# Patient Record
Sex: MALE | Race: WHITE | NOT HISPANIC OR LATINO | Employment: OTHER | URBAN - METROPOLITAN AREA
[De-identification: names, ages, dates, MRNs, and addresses within clinical notes are randomized per-mention and may not be internally consistent; named-entity substitution may affect disease eponyms.]

---

## 2017-03-13 ENCOUNTER — TELEPHONE (OUTPATIENT)
Dept: TRANSPLANT | Facility: CLINIC | Age: 62
End: 2017-03-13

## 2017-03-13 LAB
EXT ALBUMIN: 3.9
EXT ALKALINE PHOSPHATASE: 64
EXT ALT: 10
EXT AST: 13
EXT BASOPHIL%: 0.5
EXT BILIRUBIN TOTAL: 0.4
EXT BUN: 28
EXT CALCIUM: 8.8
EXT CHLORIDE: 103
EXT CO2: 27
EXT CREATININE: 2 MG/DL
EXT EOSINOPHIL%: 1
EXT GFR MDRD NON AF AMER: 25
EXT GLUCOSE: 154
EXT HBV DNA, QUALITATIVE PCR: NOT DETECTED
EXT HEMATOCRIT: 43
EXT HEMOGLOBIN: 14.1
EXT LYMPH%: 32.4
EXT MONOCYTES%: 7.3
EXT PLATELETS: 96
EXT POTASSIUM: 5
EXT PROTEIN TOTAL: 6.5
EXT SEGS%: 58.8
EXT SODIUM: 138 MMOL/L
EXT TACROLIMUS LVL: 5.8
EXT WBC: 6.3

## 2017-03-13 NOTE — TELEPHONE ENCOUNTER
----- Message from Jesus Brasher MD sent at 3/13/2017  2:23 PM CDT -----  Results reviewed    Creatinine is rising- is he unwell?    - oral rehydration and recheck on Thursday

## 2017-03-13 NOTE — TELEPHONE ENCOUNTER
"Called and spoke with pt to let him know that Dr. Brasher reviewed his labs, and his creatinine was elevated. Pt stated "that he was hospitalized in January for kidney failure, and staph in his blood, and E. Coli in his lungs". Pt stated he has an appointment with a nephrologist and with infectious disease for follow up later this month. Asked pt to please keep us informed. Pt stated he would do so.    Message sent to Dr. Brasher with this update in pt's health.  "

## 2017-03-16 ENCOUNTER — TELEPHONE (OUTPATIENT)
Dept: TRANSPLANT | Facility: CLINIC | Age: 62
End: 2017-03-16

## 2017-03-16 NOTE — TELEPHONE ENCOUNTER
"----- Message from Jesus Brasher MD sent at 3/15/2017  4:52 PM CDT -----  Next week- Tuesday/Wednesday    ----- Message -----     From: Celena Rivas RN     Sent: 3/15/2017   4:43 PM       To: Jesus Brasher MD    Ok. I will get him scheduled. When would you like him to repeat labs?     ----- Message -----     From: Jesus Brasher MD     Sent: 3/15/2017   4:24 PM       To: Celena Rivas RN    Can he come to see us sometime in the next few weeks?    ----- Message -----     From: Celena Rivas RN     Sent: 3/13/2017   4:18 PM       To: Jesus Brasher MD    I called this pt, and it turns out that he is quite unwell. Pt stated that "he was hospitalized in January for that he was hospitalized in January for kidney failure, and staph in his blood, and E. Coli in his lungs". He also stated that he has follow up appointments with a nephrologist and Infectious disease later this month.     ----- Message -----     From: Jesus Brasher MD     Sent: 3/13/2017   2:23 PM       To: [facility]    Results reviewed    Creatinine is rising- is he unwell?    - oral rehydration and recheck on Thursday          "

## 2017-03-16 NOTE — TELEPHONE ENCOUNTER
Called and spoke with pt to let him know that I updated Dr. Brasher with pt's situation. Dr. Brasher would like pt to repeat labs next week,  would like pt to come to clinic in May. Pt repeated and verbalized understanding.

## 2017-04-07 LAB
EXT ALBUMIN: 3.8
EXT ALKALINE PHOSPHATASE: 58
EXT ALT: 11
EXT AST: 13
EXT BASOPHIL%: 0.4
EXT BILIRUBIN TOTAL: 0.5
EXT BUN: 16
EXT CALCIUM: 9.3
EXT CHLORIDE: 104
EXT CO2: 29
EXT CREATININE: 1.3 MG/DL
EXT EOSINOPHIL%: 1.2
EXT GFR MDRD NON AF AMER: 42
EXT GLUCOSE: 141
EXT HEMATOCRIT: 45
EXT HEMOGLOBIN: 15
EXT LYMPH%: 24.9
EXT MONOCYTES%: 6.8
EXT PLATELETS: 109
EXT POTASSIUM: 4.8
EXT PROTEIN TOTAL: 6.6
EXT SEGS%: 66.7
EXT SODIUM: 138 MMOL/L
EXT TACROLIMUS LVL: 7.9
EXT WBC: 8

## 2017-04-11 ENCOUNTER — TELEPHONE (OUTPATIENT)
Dept: TRANSPLANT | Facility: CLINIC | Age: 62
End: 2017-04-11

## 2017-05-19 ENCOUNTER — TELEPHONE (OUTPATIENT)
Dept: TRANSPLANT | Facility: CLINIC | Age: 62
End: 2017-05-19

## 2017-05-19 LAB
EXT ALBUMIN: 3.9
EXT ALKALINE PHOSPHATASE: 68
EXT ALT: 12
EXT AST: 13
EXT BASOPHIL%: 0.5
EXT BILIRUBIN TOTAL: 0.6
EXT BUN: 25
EXT CALCIUM: 8.5
EXT CHLORIDE: 102
EXT CO2: 27
EXT CREATININE: 1.9 MG/DL
EXT EOSINOPHIL%: 3
EXT GFR MDRD NON AF AMER: 27
EXT GLUCOSE: 184
EXT HEMATOCRIT: 45
EXT HEMOGLOBIN: 15.5
EXT LYMPH%: 19.1
EXT MONOCYTES%: 7
EXT PLATELETS: 115
EXT POTASSIUM: 4.5
EXT PROTEIN TOTAL: 6.4
EXT SEGS%: 70.4
EXT SODIUM: 135 MMOL/L
EXT TACROLIMUS LVL: 9.3
EXT WBC: 10.3

## 2017-05-19 NOTE — TELEPHONE ENCOUNTER
Called and spoke with pt to let him know that Dr. Brasher reveiwed his labs, and would like him to decrease his Prograf to 2 mg in the morning, and 1 mg in the evening. Repeat labs Mon. 5/22. Pt repeated and verbalized understanding. Pt is taking lisinopril and losartan. Message sent to Dr. Brasher.

## 2017-05-19 NOTE — TELEPHONE ENCOUNTER
----- Message from Jesus Brasher MD sent at 5/19/2017  1:51 PM CDT -----  Results reviewed  Reduce tac to 2/1  Can you check that he is not taking both lisinopril AND losartan ( I just see that both meds are listed)?

## 2017-05-26 ENCOUNTER — TELEPHONE (OUTPATIENT)
Dept: TRANSPLANT | Facility: CLINIC | Age: 62
End: 2017-05-26

## 2017-05-26 NOTE — TELEPHONE ENCOUNTER
Called pt's lab for lab results. Pt did not go. Called and spoke with pt. Pt said he forgot to go. Will go Mon. 5/29. Also advised pt that Dr. Brasher recommends he go back to doctor who prescribed lisinopril and losartan to see if he needs to continue to be on both. Pt repeated and verbalized understanding.

## 2017-05-29 ENCOUNTER — TELEPHONE (OUTPATIENT)
Dept: TRANSPLANT | Facility: CLINIC | Age: 62
End: 2017-05-29

## 2017-05-29 NOTE — TELEPHONE ENCOUNTER
Returned call to lab. Spoke with Kayla. Confirmed that what was on order sheet was what we need to be drawn.

## 2017-06-01 LAB
EXT ALBUMIN: 3.8
EXT ALKALINE PHOSPHATASE: 72
EXT ALT: 11
EXT AST: 12
EXT BASOPHIL%: 0.6
EXT BILIRUBIN TOTAL: 0.4
EXT BUN: 23
EXT CALCIUM: 8.7
EXT CHLORIDE: 104
EXT CO2: 29
EXT CREATININE: 1.2 MG/DL
EXT EOSINOPHIL%: 7.9
EXT GFR MDRD NON AF AMER: 46
EXT GLUCOSE: 175
EXT HEMATOCRIT: 45
EXT HEMOGLOBIN: 15.1
EXT HEP B QUANT: NOT DETECTED
EXT LYMPH%: 27
EXT MONOCYTES%: 5.8
EXT PLATELETS: 114
EXT POTASSIUM: 4.6
EXT PROTEIN TOTAL: 6.4
EXT SODIUM: 138 MMOL/L
EXT TACROLIMUS LVL: 6.8
EXT WBC: 10.2

## 2017-06-02 ENCOUNTER — TELEPHONE (OUTPATIENT)
Dept: TRANSPLANT | Facility: CLINIC | Age: 62
End: 2017-06-02

## 2017-06-02 NOTE — TELEPHONE ENCOUNTER
Called and left pt voicemail to let him know that Dr. Brasher reviewed his labs, and they were stable. No changes needed. Repeat labs Mon. 7/3. Also sent letter.

## 2017-07-11 LAB
EXT ALBUMIN: 3.8
EXT ALKALINE PHOSPHATASE: 70
EXT ALT: 11
EXT AST: 13
EXT BASOPHIL%: 0.5
EXT BILIRUBIN TOTAL: 0.6
EXT BUN: 18
EXT CALCIUM: 8.7
EXT CHLORIDE: 100
EXT CO2: 31
EXT CREATININE: 1.4 MG/DL
EXT EOSINOPHIL%: 7.5
EXT GFR MDRD NON AF AMER: 38
EXT GLUCOSE: 184
EXT HEMATOCRIT: 45
EXT HEMOGLOBIN: 15.1
EXT HEP B S AG: NEGATIVE
EXT LYMPH%: 23.2
EXT MONOCYTES%: 6.4
EXT PLATELETS: 110
EXT POTASSIUM: 4.2
EXT PROTEIN TOTAL: 6.6
EXT SEGS%: 62.4
EXT SODIUM: 138 MMOL/L
EXT TACROLIMUS LVL: 5.5
EXT WBC: 9

## 2017-07-12 ENCOUNTER — TELEPHONE (OUTPATIENT)
Dept: TRANSPLANT | Facility: CLINIC | Age: 62
End: 2017-07-12

## 2017-07-12 NOTE — TELEPHONE ENCOUNTER
Labs reviewed by Dr. Brasher  Continue routine labs no changes needed.  Letter sent for next lab appointment 10/2/17

## 2017-09-25 ENCOUNTER — TELEPHONE (OUTPATIENT)
Dept: TRANSPLANT | Facility: CLINIC | Age: 62
End: 2017-09-25

## 2017-09-25 NOTE — TELEPHONE ENCOUNTER
Called patient to see if he was coming in for his scheduled appointment. Primary number not in service. Also tried his wife's number which went to voicemail.

## 2017-09-29 ENCOUNTER — TELEPHONE (OUTPATIENT)
Dept: TRANSPLANT | Facility: CLINIC | Age: 62
End: 2017-09-29

## 2017-10-23 ENCOUNTER — TELEPHONE (OUTPATIENT)
Dept: TRANSPLANT | Facility: CLINIC | Age: 62
End: 2017-10-23

## 2017-10-23 NOTE — TELEPHONE ENCOUNTER
----- Message from Keke Beverly sent at 10/23/2017  4:17 PM CDT -----  Contact: [facility]  Call regarding patient labs, states it was not a critical.     Call [phone]

## 2017-10-23 NOTE — TELEPHONE ENCOUNTER
Returned call. Lab stated they were unable to run Hep. B DNA quant. Called and left pt voicemail to ask him to go back to lab to have test done. Advised to call back so that I know he received my message.

## 2017-10-24 ENCOUNTER — TELEPHONE (OUTPATIENT)
Dept: TRANSPLANT | Facility: CLINIC | Age: 62
End: 2017-10-24

## 2017-10-24 LAB
EXT ALBUMIN: 3.9
EXT ALKALINE PHOSPHATASE: 66
EXT ALT: 11
EXT AST: 15
EXT BASOPHIL%: 0.5
EXT BILIRUBIN TOTAL: 0.7
EXT BUN: 16
EXT CALCIUM: 9
EXT CHLORIDE: 105
EXT CO2: 24
EXT CREATININE: 1.5 MG/DL
EXT EOSINOPHIL%: 1.3
EXT GFR MDRD NON AF AMER: 35
EXT GLUCOSE: 206
EXT HEMATOCRIT: 47
EXT HEMOGLOBIN: 15.4
EXT HEP B S AG: NEGATIVE
EXT LYMPH%: 25.9
EXT MONOCYTES%: 5.9
EXT PLATELETS: 126
EXT POTASSIUM: 4.6
EXT PROTEIN TOTAL: 6.4
EXT SEGS%: 66.4
EXT SODIUM: 138 MMOL/L
EXT TACROLIMUS LVL: 2.9
EXT WBC: 9

## 2017-10-24 NOTE — TELEPHONE ENCOUNTER
Labs reviewed by Dr. Brasher  Continue routine labs no changes needed.  Letter sent for next lab appointment 1/15/18

## 2017-12-06 ENCOUNTER — OFFICE VISIT (OUTPATIENT)
Dept: TRANSPLANT | Facility: CLINIC | Age: 62
End: 2017-12-06
Payer: MEDICARE

## 2017-12-06 VITALS
HEIGHT: 72 IN | BODY MASS INDEX: 37.25 KG/M2 | HEART RATE: 59 BPM | TEMPERATURE: 97 F | SYSTOLIC BLOOD PRESSURE: 137 MMHG | OXYGEN SATURATION: 96 % | DIASTOLIC BLOOD PRESSURE: 86 MMHG | WEIGHT: 275 LBS | RESPIRATION RATE: 20 BRPM

## 2017-12-06 DIAGNOSIS — T50.901D ACCIDENTAL OVERDOSE, SUBSEQUENT ENCOUNTER: ICD-10-CM

## 2017-12-06 DIAGNOSIS — I10 ESSENTIAL HYPERTENSION: ICD-10-CM

## 2017-12-06 DIAGNOSIS — N28.9 RENAL INSUFFICIENCY: ICD-10-CM

## 2017-12-06 DIAGNOSIS — Z94.4 LIVER REPLACED BY TRANSPLANT: Primary | ICD-10-CM

## 2017-12-06 DIAGNOSIS — Z79.4 TYPE 2 DIABETES MELLITUS WITH COMPLICATION, WITH LONG-TERM CURRENT USE OF INSULIN: ICD-10-CM

## 2017-12-06 DIAGNOSIS — Z29.89 PROPHYLACTIC IMMUNOTHERAPY: ICD-10-CM

## 2017-12-06 DIAGNOSIS — J44.9 CHRONIC OBSTRUCTIVE PULMONARY DISEASE, UNSPECIFIED COPD TYPE: ICD-10-CM

## 2017-12-06 DIAGNOSIS — Z86.19 HISTORY OF HEPATITIS C: ICD-10-CM

## 2017-12-06 DIAGNOSIS — E11.8 TYPE 2 DIABETES MELLITUS WITH COMPLICATION, WITH LONG-TERM CURRENT USE OF INSULIN: ICD-10-CM

## 2017-12-06 DIAGNOSIS — Z95.1 H/O CORONARY ARTERY BYPASS SURGERY: ICD-10-CM

## 2017-12-06 DIAGNOSIS — G89.29 OTHER CHRONIC PAIN: ICD-10-CM

## 2017-12-06 PROCEDURE — 99214 OFFICE O/P EST MOD 30 MIN: CPT | Mod: S$GLB,,, | Performed by: NURSE PRACTITIONER

## 2017-12-06 PROCEDURE — 99999 PR PBB SHADOW E&M-EST. PATIENT-LVL III: CPT | Mod: PBBFAC,,, | Performed by: NURSE PRACTITIONER

## 2017-12-06 RX ORDER — DIAZEPAM 10 MG/1
10 TABLET ORAL 2 TIMES DAILY
COMMUNITY
Start: 2017-10-25 | End: 2018-10-25

## 2017-12-06 RX ORDER — INSULIN ASPART 100 [IU]/ML
60 INJECTION, SOLUTION INTRAVENOUS; SUBCUTANEOUS DAILY
COMMUNITY

## 2017-12-06 NOTE — PROGRESS NOTES
Transplant Hepatology  Liver Transplant Recipient Follow-up    Transplant Date: 2007  OS Native Liver Dx: Cirrhosis: Type C    Pedro is here for yearly follow up of his liver transplant.    ORGAN: LIVER  Whole or Partial: whole liver  Donor Type:  - brain death  Donor CMV Status: Positive  Donor HCV Status: Positive  Donor HBcAb: Positive    He has had the following complications since transplant: hypertension, renal insufficiency, diabetes. The noted complications are well controlled.     Subjective:     Interval History: Pedro was last seen on 2016 by Dr. Brasher. He is almost 11 years post liver transplant for cirrhosis 2/2 chronic hepatitis C genotype 1b. He had histologic recurrence of hepatitis C after transplantation and was treated with antiviral therapy including pegylated interferon and ribavirin in . He achieved a sustained virologic response. He also received a HBV core + donor liver and remains on lamivudine. HBV not detected.        He is maintained on tacro  for immunosuppression with good allograft function. LFTs remain normal. He has had some renal insufficiency in the past; Cr 1.2-1.9 over last 6 months. Cr 1.5 on most recent labs in 10/2017. Is taking both lisinopril and losartan. Was also recently put on torsemide by his cardiologist for swelling in BLE.       He was admitted to [place] in 2015 with multidrug overdose, hypercapnic respiratory failure, and acute kidney failure. He notes two subsequent hospitalizations since then; last one in 2017 requiring intubation. He has since had genetic testing and determined that he does not metabolize certain pain medications. He is following with pain management for his back pain, currently on oxymorphone and valium PRN.      He is not satisfied with his current PCP; is scheduled for follow-up with a new provider on 2018 at [facility].    Health Maintenance:   -Bone mineral density: overdue, has  f/u with PCP   -Colonoscopy: overdue; reports polyps on his last colonoscopy. Has f/u with PCP.     -Dermatology: Hx of squamous carcinoma, basal cell, and melatinoid; due for follow-up skin check     -Smoking cessation: He has COPD; continues to smoke but has cut back to 1 pack every 4-5 days. Tried Chantix in the past.   -Weight loss: Is attempting to lose weight with dietary changes; down 5 lb since clinic visit last year.      -BP today: 137/86      Review of Systems   GENERAL: Denies fever, chills. (+) weight loss. (+) night sweats. (+) fatigue.   HEENT: Denies headaches, dizziness, vision/hearing changes  CARDIOVASCULAR: Denies chest pain, palpitations. (+) mild swelling to BLE.   RESPIRATORY: Denies dyspnea, cough  GI: Denies abdominal pain, rectal bleeding, nausea, vomiting. No change in bowel pattern or color. (+) abdominal distention.  : Denies dysuria, hematuria   SKIN: Denies rash, itching   NEURO: Denies confusion, memory loss, or mood changes  PSYCH: Denies depression or anxiety  HEME/LYMPH: Denies easy bruising or bleeding  MSK: (+) back pain, (+) myalgias     Objective:     Physical Exam   PHYSICAL EXAM:   Friendly White male, in no acute distress; alert and oriented to person, place and time  VITALS:   Vitals:    12/06/17 1351   BP: 137/86   Pulse: (!) 59   Resp: 20   Temp: 97.3 °F (36.3 °C)   HENT: Normocephalic, without obvious abnormality. Oral mucosa pink and moist. Dentition good.  EYES: Sclerae anicteric. No conjunctival pallor.   NECK: Supple. No masses or cervical adenopathy.  CARDIOVASCULAR: Regular rate and rhythm. No murmurs.  RESPIRATORY: Normal respiratory effort. Bilateral breath sounds diminished. No wheezes or crackles.  GI: Soft, non-tender. (+) abdominal distention (obese). No hepatosplenomegaly. No masses palpable. No ascites. Liver transplant incision well healed.   EXTREMITIES:  No clubbing, cyanosis. +1 edema to BLE.   SKIN: Warm and dry. No jaundice. No rashes noted to  exposed skin. No telangectasias noted. No palmar erythema.  NEURO:  No asterixis. Gait slightly impaired, using a cane.   PSYCH:  Memory intact. Thought and speech pattern appropriate. Behavior normal. No depression or anxiety noted.    Lab Results - External from 10/17/2017  -- TBili 0.7  -- AST 15  -- ALT 11  -- Alk phos 66  -- Cr 1.5  -- Albumin 3.9    -- WBC 9.0  -- Hgb 15.4  -- Hct 47  -- Platelets 126    -- Tacrolimus 2.9    Assessment/Plan:   1. S/P liver transplant due to: cirrhosis 2/2 chronic hepatitis C.   -- Stable liver function and LFTs  -- SVR achieved post transplant with INF/Riba  -- Received a HBV core + donor liver and remains on lamivudine. HBV not detected.   -- Continue post transplant labs per protocol. Next due Jan 2018.    2. Chronic immunosuppression   -- Remains on prograf monotherapy; 2/1.   -- Chronic immunosuppressive medications for rejection prophylaxis at target. No adjustment needed at this time.   -- Continue monitoring symptoms, labs, and drug levels for drug-related toxicity and side effects.    3. Renal insufficiency  -- Cr stable at 1.5  -- Pt taking lisinopril and losartan; instructed to ask PCP if he can stop one of these.    4. COPD  -- Still smoking but has made efforts to cut back. Counseled on the importance of discontinuing tobacco use    5. Hypertension   -- Appears well controlled, managed per PCP    6. DM Type 2, currently on insulin  -- Managed per PCP, needs better control     7. Chronic pain and history of accidental overdose   -- Following with pain management      8. History of CABG  -- Following with Cardiology     Health Maintenance:  -Instructed to f/u with PCP for regular health maintenance care including bone mineral density screenings and colonoscopy, both of which are overdue. I reminded him that he may need colonoscopies more frequently as he reports polyps were found last time. We also discussed the importance of f/u with Dermatology for annual skin  checks, especially given his history of skin CA. Reviewed need to avoid sun exposure with use of sunblock, hats, long sleeves related to increased risk of skin cancers. Weight loss efforts encouraged, in addition to diabetic and low sodium diet.     Return to clinic in 1 year.     Ledy Serrato NP    Hepatology and Liver Transplant        [facility] Patient Status  Functional Status: 100% - Normal, no complaints, no evidence of disease  Physical Capacity: Limited Mobility

## 2017-12-06 NOTE — LETTER
December 6, 2017                     [facility]  [address]  [facility] [address]  Phone: [phone]   Patient: Pedro Landon   MR Number: [id number]   YOB: 1955   Date of Visit: 12/6/2017       Dear      Thank you for referring Pedro Landon to me for evaluation. Attached you will find relevant portions of my assessment and plan of care.    If you have questions, please do not hesitate to call me. I look forward to following Pedro Landon along with you.    Sincerely,    Ledy Serrato, NP    Enclosure    If you would like to receive this communication electronically, please contact [email or web address] or [phone] to request [employer] access.    [employer] is a tool which provides read-only access to select patient information with whom you have a relationship. Its easy to use and provides real time access to review your patients record including encounter summaries, notes, results, and demographic information.    If you feel you have received this communication in error or would no longer like to receive these types of communications, please e-mail [email or web address]

## 2017-12-06 NOTE — PATIENT INSTRUCTIONS
1. Labs due 1/15/2018  2. Follow-up with your Internal Medicine doctor as scheduled. Ask if you need to be on lisinopril and losartan (should be on one or the other for your blood pressure). Need repeat colonoscopy and bone scan.           3. Follow-up with dermatology as soon as possible for skin check  4. Continue weight loss efforts  5. Continue following with pain management     6. Return to clinic in 1 year.

## 2018-01-18 ENCOUNTER — TELEPHONE (OUTPATIENT)
Dept: TRANSPLANT | Facility: CLINIC | Age: 63
End: 2018-01-18

## 2018-01-18 NOTE — LETTER
January 18, 2018    NAZIA Landon  [address]  [place] [address]          Dear NAZIA Landon:  MRN: [id number]    Your lab work was due to be drawn on 1/15/18.  We contacted your lab and were unable to get test results.  It is very important to get your labs drawn as scheduled.  We cannot monitor you for rejection, infections, or drug toxicity side effects without lab results.  Please call us at [phone] as soon as possible to let us know when you plan to have labs drawn.    Sincerely,    Mayra Leigh, RN, BSN  Liver Transplant Coordinator  [facility] [place] [place]  [address] [place] [address]  [phone]

## 2018-01-18 NOTE — TELEPHONE ENCOUNTER
Pt was due for labs 1/15/18.  Called pt's lab. Pt has not been to lab since October.  Missed lab letter sent.

## 2018-01-26 LAB
EXT ALBUMIN: 4.2
EXT ALKALINE PHOSPHATASE: 58
EXT ALT: 28
EXT AST: 19
EXT BASOPHIL%: 0.4
EXT BILIRUBIN TOTAL: 0.48
EXT BUN: 31
EXT CALCIUM: 8.4
EXT CHLORIDE: 109
EXT CO2: 28
EXT CREATININE: 1.7 MG/DL
EXT EOSINOPHIL%: 0.9
EXT GLUCOSE: 220
EXT HEMATOCRIT: 42
EXT HEMOGLOBIN: 13.8
EXT HEP B QUANT: NOT DETECTED
EXT HEP B S AG: NEGATIVE
EXT LYMPH%: 20.3
EXT MONOCYTES%: 6.9
EXT PLATELETS: 118
EXT POTASSIUM: 4.4
EXT PROTEIN TOTAL: 7
EXT SEGS%: 71.5
EXT SODIUM: 141 MMOL/L
EXT TACROLIMUS LVL: 2.5
EXT WBC: 5.9

## 2018-01-29 ENCOUNTER — TELEPHONE (OUTPATIENT)
Dept: TRANSPLANT | Facility: CLINIC | Age: 63
End: 2018-01-29

## 2018-02-05 ENCOUNTER — TELEPHONE (OUTPATIENT)
Dept: TRANSPLANT | Facility: CLINIC | Age: 63
End: 2018-02-05

## 2018-02-05 NOTE — LETTER
February 5, 2018    NAZIA Landon  [address] [address]          Dear NAZIA Landon:  MRN: [id number]    Your lab results were stable.  There are no medicine changes.  Please have your labs drawn again on 4/23/18.      If you cannot have your labs drawn on the scheduled date, it is your responsibility to call the transplant department to reschedule.  To reschedule or make an appointment, please as to speak to or leave a message for my assistant, Lou Alcantar, at [phone].  When leaving a message for Katharine Blake, or myself, we ask that you leave a brief message regarding your request.    Sincerely,    Mayra Leigh, RN, BSN  Liver Transplant Coordinator  [facility] [place]  [address] [place] [address]  [phone]

## 2018-02-05 NOTE — TELEPHONE ENCOUNTER
Letter sent, labs stable and no medication changes are needed. Repeat labs due 4/23/18 per protocol.

## 2018-04-23 DIAGNOSIS — Z94.4 STATUS POST LIVER TRANSPLANT: ICD-10-CM

## 2018-04-23 RX ORDER — LAMIVUDINE 150 MG/1
TABLET, FILM COATED ORAL
Qty: 60 TABLET | Refills: 11 | Status: SHIPPED | OUTPATIENT
Start: 2018-04-23

## 2018-05-02 ENCOUNTER — TELEPHONE (OUTPATIENT)
Dept: TRANSPLANT | Facility: CLINIC | Age: 63
End: 2018-05-02

## 2018-05-02 NOTE — LETTER
May 2, 2018    NAZIA Landon  [address]  [place] [address]          Dear NAZIA Landon:  MRN: [id number]    Your lab work was due to be drawn on 4/23/18.  We contacted your lab and were unable to get test results.  It is very important to get your labs drawn as scheduled.  We cannot monitor you for rejection, infections, or drug toxicity side effects without lab results.  Please call us at [phone] as soon as possible to let us know when you plan to have labs drawn.    Sincerely,    Mayra Leigh, RN, BSN  Liver Transplant Coordinator   [facility] [place]  [address] [place] [address]  [phone]

## 2018-05-07 LAB
EXT ALBUMIN: 4.1
EXT ALKALINE PHOSPHATASE: 88
EXT ALT: 11
EXT AST: 12
EXT BASOPHIL%: 0.3
EXT BILIRUBIN TOTAL: 78
EXT BUN: 29
EXT CALCIUM: 9.1
EXT CHLORIDE: 108
EXT CO2: 21
EXT CREATININE: 1.9 MG/DL
EXT EOSINOPHIL%: 0.3
EXT GLUCOSE: 226
EXT HEMATOCRIT: 44
EXT HEMOGLOBIN: 14.7
EXT LYMPH%: 19.6
EXT MONOCYTES%: 5.7
EXT PLATELETS: 134
EXT POTASSIUM: 5.3
EXT PROTEIN TOTAL: 6.6
EXT SEGS%: 74.1
EXT SODIUM: 136 MMOL/L
EXT TACROLIMUS LVL: 9.3
EXT WBC: 11.1

## 2018-05-08 ENCOUNTER — TELEPHONE (OUTPATIENT)
Dept: TRANSPLANT | Facility: CLINIC | Age: 63
End: 2018-05-08

## 2018-05-08 NOTE — TELEPHONE ENCOUNTER
----- Message from Maria R Lee sent at 5/8/2018  9:42 AM CDT -----  Contact: pt  Needs Medical Advice    Who Called: pt  Symptoms (please be specific):    How long has patient had these symptoms:    Pharmacy name and phone # if needed:    Communication Preference ([place] response to Pt. (or) Call Back # and timeframe): [phone]  Additional Information: calling to inform coordinator that labs were done yesterday

## 2018-05-11 ENCOUNTER — TELEPHONE (OUTPATIENT)
Dept: TRANSPLANT | Facility: CLINIC | Age: 63
End: 2018-05-11

## 2018-05-14 ENCOUNTER — TELEPHONE (OUTPATIENT)
Dept: TRANSPLANT | Facility: CLINIC | Age: 63
End: 2018-05-14

## 2018-05-14 DIAGNOSIS — Z94.4 LIVER REPLACED BY TRANSPLANT: ICD-10-CM

## 2018-05-14 RX ORDER — DILTIAZEM HYDROCHLORIDE 30 MG/1
30 TABLET, FILM COATED ORAL 3 TIMES DAILY
COMMUNITY

## 2018-05-14 NOTE — TELEPHONE ENCOUNTER
----- Message from Maria R Lee sent at 5/14/2018  3:52 PM CDT -----  Contact: pt  Patient Returning Call    Who Called:  Who Left Message for Patient:  Does the patient know what this is regarding?:  Communication Preference (Joanne response to Pt. (or) Call Back # and timeframe) [phone]  Additional Information: rerturnng call to Mayra

## 2018-05-14 NOTE — TELEPHONE ENCOUNTER
----- Message from Jesus Brasher MD sent at 5/11/2018  4:49 PM CDT -----  Results reviewed  Reduce tac to 2/1  
Called pt on home and cell. LVM requesting return call on home phone. Cell did not have VM set up.  
,DirectAddress_Unknown

## 2018-05-15 RX ORDER — TACROLIMUS 1 MG/1
1 CAPSULE ORAL EVERY 12 HOURS
Qty: 60 CAPSULE | Refills: 0 | Status: SHIPPED | OUTPATIENT
Start: 2018-05-15 | End: 2018-10-08 | Stop reason: SDUPTHER

## 2018-05-17 ENCOUNTER — TELEPHONE (OUTPATIENT)
Dept: TRANSPLANT | Facility: CLINIC | Age: 63
End: 2018-05-17

## 2018-05-17 NOTE — TELEPHONE ENCOUNTER
----- Message from Jesus Brasher MD sent at 5/17/2018  2:31 PM CDT -----  Yes please    ----- Message -----  From: Mayra Leigh RN  Sent: 5/14/2018   4:35 PM  To: Jesus Brasher MD    He was started on cardizem 30mg TID back in February for A fib.  Might be why his tacrolimus level was higher. I added it to his med list. He is already on tacrolimus 2/1. Go to 1/1?    Mayra

## 2018-06-01 ENCOUNTER — TELEPHONE (OUTPATIENT)
Dept: TRANSPLANT | Facility: CLINIC | Age: 63
End: 2018-06-01

## 2018-06-01 LAB
EXT ALBUMIN: 4
EXT ALKALINE PHOSPHATASE: 102
EXT ALT: 12
EXT AST: 12
EXT BILIRUBIN TOTAL: 0.52
EXT BUN: 19
EXT CALCIUM: 8.8
EXT CHLORIDE: 101
EXT CO2: 28
EXT CREATININE: 1.5 MG/DL
EXT GLUCOSE: 234
EXT HEP B QUANT: NOT DETECTED
EXT HEP B S AG: NEGATIVE
EXT POTASSIUM: 4.6
EXT PROTEIN TOTAL: 6.2
EXT SODIUM: 135 MMOL/L
EXT TACROLIMUS LVL: 7.9

## 2018-06-11 ENCOUNTER — TELEPHONE (OUTPATIENT)
Dept: TRANSPLANT | Facility: CLINIC | Age: 63
End: 2018-06-11

## 2018-06-11 NOTE — TELEPHONE ENCOUNTER
Returned call, spoke with pt. Advised that labs are stable. Repeat labs due 8/27/18. Pt agreed with plan.

## 2018-06-11 NOTE — TELEPHONE ENCOUNTER
----- Message from Anitha Bentley sent at 6/11/2018  2:29 PM CDT -----  Contact: patient   Patient calling for lab results.        Please call [phone]      Thanks!

## 2018-07-17 ENCOUNTER — TELEPHONE (OUTPATIENT)
Dept: TRANSPLANT | Facility: CLINIC | Age: 63
End: 2018-07-17

## 2018-07-17 NOTE — TELEPHONE ENCOUNTER
Received request for medical clearance for back surgery and injections. Reviewed with Dr. Brasher.  Per MD, pt ok to proceed from a transplant perspective only.  Requested they discuss with pt's cardiologist.     Called Dr. Villagomez's RN.  LVM requesting call to discuss.

## 2018-07-25 ENCOUNTER — TELEPHONE (OUTPATIENT)
Dept: TRANSPLANT | Facility: CLINIC | Age: 63
End: 2018-07-25

## 2018-07-25 NOTE — LETTER
2018    Pedro Landon  [address] [place] [address] [place] [address]             [facility]  [address]  [place] [address]  Phone: [phone] Dr. Villagomez,    Patient, Pedro Landon  1955, is cleared from a transplant perspective to have L1 kyphoplasty with lumbar facet steroid injection.    Please contact out office with any questions or concerns.    Sincerely,      Jesus Brasher MD  Transplant Hepatology

## 2018-07-25 NOTE — TELEPHONE ENCOUNTER
----- Message from Anitha Bentley sent at 7/25/2018  9:19 AM CDT -----  Contact: patient   Patient have a medical question about having back surgery also would like to know when is his next lab appointment.         Please call [phone]      Thanks!

## 2018-07-26 NOTE — TELEPHONE ENCOUNTER
Called pt. LVM requesting return call.    Faxed letter from Dr. Brasher with back surgery clearance.     Sent message via portal with lab info and advised that we sent letter for clearance.

## 2018-08-27 LAB
EXT ALBUMIN: 3.8
EXT ALKALINE PHOSPHATASE: 80
EXT ALT: 10
EXT AST: 11
EXT BASOPHIL%: 0.5
EXT BILIRUBIN TOTAL: 0.7
EXT BUN: 22
EXT CALCIUM: 8.7
EXT CHLORIDE: 105
EXT CO2: 21
EXT CREATININE: 1.3 MG/DL
EXT EOSINOPHIL%: 0.6
EXT GLUCOSE: 288
EXT HEMATOCRIT: 40
EXT HEMOGLOBIN: 13.8
EXT LYMPH%: 16.3
EXT MONOCYTES%: 7.8
EXT PLATELETS: 136
EXT POTASSIUM: 4.1
EXT PROTEIN TOTAL: 6.3
EXT SEGS%: 74.8
EXT SODIUM: 133 MMOL/L
EXT TACROLIMUS LVL: 7.5
EXT WBC: 10

## 2018-08-28 ENCOUNTER — TELEPHONE (OUTPATIENT)
Dept: TRANSPLANT | Facility: CLINIC | Age: 63
End: 2018-08-28

## 2018-08-28 NOTE — TELEPHONE ENCOUNTER
Letter sent, labs stable and no medication changes are needed. Repeat labs due 11/26/18 per protocol.

## 2018-08-28 NOTE — LETTER
August 28, 2018    NAZIA Landon  [address] [facility] [address]          Dear NAZIA Landon:  MRN: [id number]    Your lab results were stable.  There are no medicine changes.  Please have your labs drawn again on 11/26/18.      If you cannot have your labs drawn on the scheduled date, it is your responsibility to call the transplant department to reschedule.  To reschedule or make an appointment, please as to speak to or leave a message for my assistant, Lou Alcantar, at [phone].  When leaving a message for Katharine Blake, or myself, we ask that you leave a brief message regarding your request.    Sincerely,      Mayra Leigh, RN, BSN  Liver Transplant Coordinator  [facility] [place]  [address] [place] [address]  [phone]

## 2018-10-08 DIAGNOSIS — Z94.4 LIVER REPLACED BY TRANSPLANT: ICD-10-CM

## 2018-10-09 RX ORDER — TACROLIMUS 1 MG/1
1 CAPSULE ORAL EVERY 12 HOURS
Qty: 60 CAPSULE | Refills: 0 | Status: SHIPPED | OUTPATIENT
Start: 2018-10-09 | End: 2019-02-07

## 2018-11-07 ENCOUNTER — TELEPHONE (OUTPATIENT)
Dept: TRANSPLANT | Facility: CLINIC | Age: 63
End: 2018-11-07

## 2018-11-07 LAB
EXT ALBUMIN: 4.1
EXT ALKALINE PHOSPHATASE: 109
EXT ALT: 13
EXT AST: 16
EXT BASOPHIL%: 0
EXT BILIRUBIN TOTAL: 0.5
EXT BUN: 19
EXT CALCIUM: 9.4
EXT CHLORIDE: 103
EXT CO2: 21
EXT CREATININE: 1.46 MG/DL
EXT EOSINOPHIL%: 0
EXT GLUCOSE: 220
EXT HEMATOCRIT: 41.1
EXT HEMOGLOBIN: 13.8
EXT HEP B QUANT: NOT DETECTED
EXT HEP B S AG: NEGATIVE
EXT LYMPH%: 27
EXT MONOCYTES%: 7
EXT PLATELETS: 114
EXT POTASSIUM: 4.3
EXT PROTEIN TOTAL: 6.6
EXT SEGS%: 66
EXT SODIUM: 141 MMOL/L
EXT TACROLIMUS LVL: 5.5
EXT WBC: 9.1

## 2018-11-07 NOTE — TELEPHONE ENCOUNTER
Letter sent, labs stable and no medication changes are needed. Repeat labs due 2/4/19 per protocol.

## 2018-11-07 NOTE — LETTER
November 7, 2018    NAZIA Landon  [address] [facility] [address] [facility] [address]          Dear NAZIA Landon:  MRN: [id number]    Your lab results were stable.  There are no medicine changes.  Please have your labs drawn again on 2/4/19.      If you cannot have your labs drawn on the scheduled date, it is your responsibility to call the transplant department to reschedule.  To reschedule or make an appointment, please as to speak to or leave a message for my assistant, Lou Alcantar, at [phone].  When leaving a message for Katharine Blake, or myself, we ask that you leave a brief message regarding your request.    Sincerely,    Mayra Leigh, RN, BSN, [facility]  Liver Transplant Coordinator  [facility] [place]  [address] [place] [address]  [phone]

## 2019-01-23 LAB
EXT ALBUMIN: 3.4
EXT ALKALINE PHOSPHATASE: 126
EXT ALT: 28
EXT AST: 32
EXT BASOPHIL%: 0
EXT BILIRUBIN TOTAL: 0.6
EXT BUN: 11
EXT CALCIUM: 8
EXT CHLORIDE: 107
EXT CO2: 19
EXT CREATININE: 1.54 MG/DL
EXT EOSINOPHIL%: 1
EXT GLUCOSE: 216
EXT HEMATOCRIT: 41.1
EXT HEMOGLOBIN: 13.6
EXT HEP B QUANT: NOT DETECTED
EXT HEP B S AG: NEGATIVE
EXT LYMPH%: 30
EXT MONOCYTES%: 7
EXT PLATELETS: 102
EXT POTASSIUM: 3.5
EXT PROTEIN TOTAL: 6
EXT SEGS%: 61
EXT SODIUM: 143 MMOL/L
EXT TACROLIMUS LVL: 10.5
EXT WBC: 8.1

## 2019-01-25 ENCOUNTER — TELEPHONE (OUTPATIENT)
Dept: TRANSPLANT | Facility: CLINIC | Age: 64
End: 2019-01-25

## 2019-01-25 NOTE — TELEPHONE ENCOUNTER
Letter sent, labs stable and no medication changes are needed. Repeat labs due 4/22/19 per protocol.

## 2019-01-25 NOTE — LETTER
January 25, 2019    NAZIA Landon  [address] [facility] [place] [address]          Dear NAZIA Landon:  MRN: [id number]    This is a follow up to your recent labs, your lab results were stable.  There are no medicine changes.  Please have your labs drawn again on 4/22/19.      If you cannot have your labs drawn on the scheduled date, it is your responsibility to call the transplant department to reschedule.  To reschedule or make an appointment, please as to speak to or leave a message for my assistant, Lou Alcantar, at [phone].  When leaving a message for Katharine Blake, or myself, we ask that you leave a brief message regarding your request.    Sincerely,    Mayra Leigh, RN, BSN, [facility]  Liver Transplant Coordinator  [facility] [place]  [address] [place] [address]  [phone]

## 2019-02-07 ENCOUNTER — OFFICE VISIT (OUTPATIENT)
Dept: TRANSPLANT | Facility: CLINIC | Age: 64
End: 2019-02-07
Payer: MEDICARE

## 2019-02-07 VITALS
RESPIRATION RATE: 18 BRPM | DIASTOLIC BLOOD PRESSURE: 98 MMHG | SYSTOLIC BLOOD PRESSURE: 179 MMHG | TEMPERATURE: 98 F | HEART RATE: 76 BPM | OXYGEN SATURATION: 97 % | HEIGHT: 71 IN | BODY MASS INDEX: 35.31 KG/M2 | WEIGHT: 252.19 LBS

## 2019-02-07 DIAGNOSIS — Z95.1 H/O CORONARY ARTERY BYPASS SURGERY: ICD-10-CM

## 2019-02-07 DIAGNOSIS — Z94.4 LIVER REPLACED BY TRANSPLANT: Primary | ICD-10-CM

## 2019-02-07 DIAGNOSIS — E11.8 TYPE 2 DIABETES MELLITUS WITH COMPLICATION, WITH LONG-TERM CURRENT USE OF INSULIN: ICD-10-CM

## 2019-02-07 DIAGNOSIS — Z86.19 HISTORY OF HEPATITIS C: ICD-10-CM

## 2019-02-07 DIAGNOSIS — Z79.4 TYPE 2 DIABETES MELLITUS WITH COMPLICATION, WITH LONG-TERM CURRENT USE OF INSULIN: ICD-10-CM

## 2019-02-07 PROCEDURE — 3008F BODY MASS INDEX DOCD: CPT | Mod: CPTII,S$GLB,, | Performed by: INTERNAL MEDICINE

## 2019-02-07 PROCEDURE — 3080F DIAST BP >= 90 MM HG: CPT | Mod: CPTII,S$GLB,, | Performed by: INTERNAL MEDICINE

## 2019-02-07 PROCEDURE — 3080F PR MOST RECENT DIASTOLIC BLOOD PRESSURE >= 90 MM HG: ICD-10-PCS | Mod: CPTII,S$GLB,, | Performed by: INTERNAL MEDICINE

## 2019-02-07 PROCEDURE — 99999 PR PBB SHADOW E&M-EST. PATIENT-LVL III: ICD-10-PCS | Mod: PBBFAC,,, | Performed by: INTERNAL MEDICINE

## 2019-02-07 PROCEDURE — 3077F PR MOST RECENT SYSTOLIC BLOOD PRESSURE >= 140 MM HG: ICD-10-PCS | Mod: CPTII,S$GLB,, | Performed by: INTERNAL MEDICINE

## 2019-02-07 PROCEDURE — 99999 PR PBB SHADOW E&M-EST. PATIENT-LVL III: CPT | Mod: PBBFAC,,, | Performed by: INTERNAL MEDICINE

## 2019-02-07 PROCEDURE — 99214 OFFICE O/P EST MOD 30 MIN: CPT | Mod: S$GLB,,, | Performed by: INTERNAL MEDICINE

## 2019-02-07 PROCEDURE — 3008F PR BODY MASS INDEX (BMI) DOCUMENTED: ICD-10-PCS | Mod: CPTII,S$GLB,, | Performed by: INTERNAL MEDICINE

## 2019-02-07 PROCEDURE — 3077F SYST BP >= 140 MM HG: CPT | Mod: CPTII,S$GLB,, | Performed by: INTERNAL MEDICINE

## 2019-02-07 PROCEDURE — 99214 PR OFFICE/OUTPT VISIT, EST, LEVL IV, 30-39 MIN: ICD-10-PCS | Mod: S$GLB,,, | Performed by: INTERNAL MEDICINE

## 2019-02-07 NOTE — PROGRESS NOTES
Subjective:       Patient ID: Pedro Landon is a 63 y.o. male.    Chief Complaint: Liver Transplant Follow-up    HPI  I saw this 63 y.o. man who was last seen by us in clinic in Dec 2017.    He had orthotopic liver transplant in January 2007 for cirrhosis secondary to chronic hepatitis C genotype 1 b. He had histologic recurrence of hepatitis C after transplantation and was treated with antiviral therapy including pegylated interferon and ribavirin in 2007. He achieved a sustained virologic response.     He also received a HBVcore +ve donor liver and remains on lamivudine.    Admitted to [facility] in Feb 2015 with multidrug overdose, hypercapnic resp failure and acute kidney failure    - COPD + sleep apnea  - multiple issues with ill health due to chronic conditions such as atrial fibrillation    LFTs: 1/21/2019  LFTs normal  Creatinine 1.54    Tac 10.5    Recurrent skin cancer    MEDS:  Tac 2/2    Off opiates and benzos      HBV and HCV not detected    PMH:  COPD  Sleep apnea    Review of Systems   Constitutional: Negative for activity change, appetite change, chills, fatigue, fever and unexpected weight change.   HENT: Negative for hearing loss.    Eyes: Negative for discharge and visual disturbance.   Respiratory: Negative for cough, chest tightness, shortness of breath and wheezing.    Cardiovascular: Negative for chest pain, palpitations and leg swelling.   Gastrointestinal: Negative for abdominal distention, abdominal pain, constipation, diarrhea and nausea.   Genitourinary: Negative for dysuria and frequency.   Musculoskeletal: Negative for arthralgias and back pain.   Skin: Negative for pallor and rash.   Neurological: Negative for dizziness, tremors, speech difficulty and headaches.   Hematological: Negative for adenopathy.   Psychiatric/Behavioral: Negative for agitation and confusion.           Lab Results   Component Value Date    ALT 15 03/17/2009    AST 14 03/17/2009     (H) 03/17/2009     ALKPHOS 102 03/17/2009    BILITOT 0.4 03/17/2009     History reviewed. No pertinent past medical history.  History reviewed. No pertinent surgical history.  Current Outpatient Medications   Medication Sig    albuterol (PROVENTIL) 2.5 mg /3 mL (0.083 %) nebulizer solution 2.5 mg.    aspirin 81 MG Chew Take 81 mg by mouth once daily.    atorvastatin (LIPITOR) 10 MG tablet Take 10 mg by mouth Daily. .    bacitracin 500 unit/gram ointment Apply 14.2 g topically 2 (two) times daily.    budesonide-formoterol 160-4.5 mcg (SYMBICORT) 160-4.5 mcg/actuation HFAA Inhale 2 puffs into the lungs every 12 (twelve) hours.    carvedilol (COREG) 25 MG tablet Take 25 mg by mouth 2 (two) times daily with meals.     diltiaZEM (CARDIZEM) 30 MG tablet Take 30 mg by mouth 3 (three) times daily.    diphenoxylate-atropine (LOMOTIL) 2.5-0.025 mg Tab Take 2 tablets by mouth 2 (two) times daily.     fluconazole (DIFLUCAN) 150 MG Tab Take 100 mg by mouth once daily.    fluticasone-salmeterol 250-50 mcg/dose (ADVAIR) 250-50 mcg/dose diskus inhaler Inhale 2 puffs into the lungs 3 (three) times daily.    glimepiride (AMARYL) 1 MG tablet 1 mg 2 (two) times daily.     insulin aspart (NOVOLOG) 100 unit/mL injection Inject 60 Units into the skin once daily.    lamiVUDine (EPIVIR) 150 MG Tab TAKE (1) TABLET BY MOUTH ONCE DAILY.    levothyroxine (SYNTHROID) 125 MCG Tab     lisinopril (PRINIVIL,ZESTRIL) 5 MG tablet Take 5 mg by mouth Daily.    losartan (COZAAR) 100 MG tablet Take 100 mg by mouth Daily.    nitroGLYCERIN (NITROSTAT) 0.4 MG SL tablet Place 0.4 mg under the tongue.    primidone (MYSOLINE) 50 MG Tab Take 50 mg by mouth 4 (four) times daily.    PROAIR HFA 90 mcg/actuation inhaler Inhale 2 puffs into the lungs every 6 (six) hours as needed. prn    ranitidine (ZANTAC) 150 MG tablet Take 150 mg by mouth Daily.    sertraline (ZOLOFT) 100 MG Tab Take 2 tablets by mouth Daily.    sulfamethoxazole-trimethoprim 800-160mg (BACTRIM  DS) 800-160 mg Tab 1 tablet 2 (two) times daily.     tacrolimus (PROGRAF) 1 MG Cap Take 1 capsule (1 mg total) by mouth every 12 (twelve) hours.    tiotropium (SPIRIVA) 18 mcg inhalation capsule Inhale 18 mcg into the lungs once daily.     No current facility-administered medications for this visit.        Objective:      Physical Exam   Constitutional: He is oriented to person, place, and time. He appears well-developed and well-nourished.   HENT:   Head: Normocephalic and atraumatic.   Eyes: Pupils are equal, round, and reactive to light.   Neck: No thyromegaly present.   Cardiovascular: Normal rate, regular rhythm and normal heart sounds.   Pulmonary/Chest: Effort normal and breath sounds normal. He has no wheezes.   Abdominal: Soft. He exhibits no distension and no mass. There is no tenderness.   Lymphadenopathy:     He has no cervical adenopathy.   Neurological: He is alert and oriented to person, place, and time.   Skin: Skin is warm. No rash noted. No erythema.   Psychiatric: He has a normal mood and affect. His behavior is normal.       Assessment:       1. Liver replaced by transplant    2. Type 2 diabetes mellitus with complication, with long-term current use of insulin    3. History of hepatitis C    4. H/O coronary artery bypass surgery        Plan:   Stable from liver point of view.  - reduce Tac to 2/1 mg daily  - hypertensive- will see PCP  - advised to lose weight    Clinic in 1 year.

## 2019-02-07 NOTE — Clinical Note
February 7, 2019                     [facility]  [address]  [facility] [address]  Phone: [phone]   Patient: Pedro Landon   MR Number: [id number]   YOB: 1955   Date of Visit: 2/7/2019       Dear      Thank you for referring Pedro Landon to me for evaluation. Attached you will find relevant portions of my assessment and plan of care.    If you have questions, please do not hesitate to call me. I look forward to following Pedro Landon along with you.    Sincerely,    Jesus Brasher MD    Enclosure    If you would like to receive this communication electronically, please contact [email or web address] or [phone] to request [employer] access.    [employer] is a tool which provides read-only access to select patient information with whom you have a relationship. Its easy to use and provides real time access to review your patients record including encounter summaries, notes, results, and demographic information.    If you feel you have received this communication in error or would no longer like to receive these types of communications, please e-mail [email or web address]

## 2019-04-26 ENCOUNTER — TELEPHONE (OUTPATIENT)
Dept: TRANSPLANT | Facility: CLINIC | Age: 64
End: 2019-04-26

## 2019-04-26 NOTE — LETTER
April 26, 2019    NAZIA Landon  [address] [facility]  [place] [address]          Dear NAZIA Landon:  MRN: [id number]    Your lab work was due to be drawn on 4/22/19.  We contacted your lab and were unable to get test results.  It is very important to get your labs drawn as scheduled.  We cannot monitor you for rejection, infections, or drug toxicity side effects without lab results.  Please call us at [phone] as soon as possible to let us know when you plan to have labs drawn.    Sincerely,    Mayra Leigh, RN, BSN, [facility]  Liver Transplant Coordinator  [facility] [place]  [address] [place] [address]  [phone]

## 2019-06-07 ENCOUNTER — TELEPHONE (OUTPATIENT)
Dept: TRANSPLANT | Facility: CLINIC | Age: 64
End: 2019-06-07

## 2019-06-07 LAB
EXT ALBUMIN: 4.2
EXT ALKALINE PHOSPHATASE: 122
EXT ALT: 19
EXT AST: 26
EXT BASOPHIL%: 0
EXT BILIRUBIN TOTAL: 0.6
EXT BUN: 13
EXT CALCIUM: 8.9
EXT CHLORIDE: 107
EXT CO2: 22
EXT CREATININE: 1.32 MG/DL
EXT EOSINOPHIL%: 1
EXT GLUCOSE: 235
EXT HEMATOCRIT: 46
EXT HEMOGLOBIN: 14.9
EXT HEP B QUANT: NOT DETECTED
EXT HEP B S AG: NEGATIVE
EXT LYMPH%: 23
EXT MONOCYTES%: 5
EXT PLATELETS: 107
EXT POTASSIUM: 4.5
EXT PROTEIN TOTAL: 7.1
EXT SEGS%: 71
EXT SODIUM: 141 MMOL/L
EXT TACROLIMUS LVL: 5
EXT WBC: 7.8

## 2019-06-07 NOTE — LETTER
June 7, 2019    NAZIA Landon  [address] [facility] [place] [address]          Dear NAZIA Landon:  MRN: [id number]    This is a follow up to your recent labs, your lab results were stable.  There are no medicine changes.  Please have your labs drawn again on 8/26/19.      If you cannot have your labs drawn on the scheduled date, it is your responsibility to call the transplant department to reschedule.  To reschedule or make an appointment, please as to speak to or leave a message for my assistant, Fawn Blake or Kelley, at [phone].  When leaving a message for Fawn Blake Angela or myself, we ask that you leave a brief message regarding your request.    Sincerely,    Mayra Leigh, RN, BSN, [facility]  Liver Transplant Coordinator  [facility] [place]  [address] [place] [address]  [phone]

## 2019-06-07 NOTE — TELEPHONE ENCOUNTER
Letter sent, labs stable and no medication changes are needed. Repeat labs due 8/26/19 per protocol.

## 2019-09-06 ENCOUNTER — TELEPHONE (OUTPATIENT)
Dept: TRANSPLANT | Facility: CLINIC | Age: 64
End: 2019-09-06

## 2019-09-06 NOTE — LETTER
September 6, 2019    NAZIA Landon  [address] [facility]  [place] [address]          Dear NAZIA Landon:  MRN: [id number]    Your lab work was due to be drawn on 8/26/19.  We contacted your lab and were unable to get test results.  It is very important to get your labs drawn as scheduled.  We cannot monitor you for rejection, infections, or drug toxicity side effects without lab results.  Please call us at [phone] as soon as possible to let us know when you plan to have labs drawn.    Sincerely,      Mayra Leigh, RN, BSN, [facility]  Liver Transplant Coordinator  [facility] [place]  [address] [place] [address]  [phone]

## 2019-09-25 ENCOUNTER — TELEPHONE (OUTPATIENT)
Dept: TRANSPLANT | Facility: CLINIC | Age: 64
End: 2019-09-25

## 2019-09-25 NOTE — TELEPHONE ENCOUNTER
----- Message from Irene Miranda sent at 9/25/2019  1:52 PM CDT -----  Contact: Self [phone]  PT states he has been off the grids for a month. He apologies but he will have this labs done tomorrow.

## 2019-09-30 ENCOUNTER — TELEPHONE (OUTPATIENT)
Dept: TRANSPLANT | Facility: CLINIC | Age: 64
End: 2019-09-30

## 2019-09-30 NOTE — TELEPHONE ENCOUNTER
Spoke to pt who states he has not been to lab yet, he states he will go tomorrow. Will await results.

## 2019-10-24 LAB
EXT ALBUMIN: 3.8
EXT ALKALINE PHOSPHATASE: 130
EXT ALT: 27
EXT AST: 23
EXT BASOPHIL%: 0
EXT BILIRUBIN TOTAL: 0.7
EXT BUN: 26
EXT CALCIUM: 8.7
EXT CHLORIDE: 103
EXT CO2: 17
EXT CREATININE: 1.74 MG/DL
EXT EOSINOPHIL%: 1
EXT GLUCOSE: 401
EXT HEMATOCRIT: 45.9
EXT HEMOGLOBIN: 14.9
EXT HEP B QUANT: ABNORMAL
EXT HEP B S AG: NEGATIVE
EXT LYMPH%: 20
EXT MONOCYTES%: 6
EXT PLATELETS: 133
EXT POTASSIUM: 4.8
EXT PROTEIN TOTAL: 6.7
EXT SEGS%: 73
EXT SODIUM: 134 MMOL/L
EXT TACROLIMUS LVL: 4.3
EXT WBC: 9.2

## 2019-10-28 ENCOUNTER — TELEPHONE (OUTPATIENT)
Dept: TRANSPLANT | Facility: CLINIC | Age: 64
End: 2019-10-28

## 2019-10-28 NOTE — TELEPHONE ENCOUNTER
Letter sent, labs stable and no medication changes are needed. Repeat labs due 1/20/20 per protocol.

## 2019-10-28 NOTE — LETTER
October 28, 2019    NAZIA Landon  [address] [facility] [place] [address]          Dear NAZIA Landon:  MRN: [id number]    This is a follow up to your recent labs, your lab results were stable.  There are no medicine changes.  Please have your labs drawn again on 1/20/20.  Please bring attached lab order with you to lab. They missed the Hep B DNA this time, so we need to ensure they draw it in January.     If you cannot have your labs drawn on the scheduled date, it is your responsibility to call the transplant department to reschedule.  To reschedule or make an appointment, please as to speak to or leave a message for my assistant, Fawn Blake or Kelley, at [phone].  When leaving a message for Fawn Blake Angela or myself, we ask that you leave a brief message regarding your request.    Sincerely,    Mayra Leigh, RN, BSN, [facility]  Liver Transplant Coordinator  [facility] [place]  [address] [place] [address]  [phone]

## 2019-10-28 NOTE — TELEPHONE ENCOUNTER
----- Message from Jesus Brasher MD sent at 10/28/2019  2:02 PM CDT -----  He can wait  His HBVsAg is negative so unlikely that he is DNA positive now      ----- Message -----  From: Mayra Leigh RN  Sent: 10/25/2019  12:41 PM CDT  To: Jesus Brasher MD    Since they missed his HBV DNA, does he need it repeated sooner or is it ok to get with next set of labs in 3 months?    Mayra    ----- Message -----  From: Jesus Brasher MD  Sent: 10/25/2019  10:36 AM CDT  To: [facility]    Results reviewed

## 2020-01-01 ENCOUNTER — TELEPHONE (OUTPATIENT)
Dept: TRANSPLANT | Facility: CLINIC | Age: 65
End: 2020-01-01

## 2020-01-28 ENCOUNTER — TELEPHONE (OUTPATIENT)
Dept: TRANSPLANT | Facility: CLINIC | Age: 65
End: 2020-01-28

## 2020-01-28 NOTE — LETTER
January 28, 2020    NAZIA Landon  [address] [facility]  [place] [address]          Dear NAZIA Landon:  MRN: [id number]    Your lab work was due to be drawn on 1/20/20.  We contacted your lab and were unable to get test results.  It is very important to get your labs drawn as scheduled.  We cannot monitor you for rejection, infections, or drug toxicity side effects without lab results.  Please call us at [phone] as soon as possible to let us know when you plan to have labs drawn.    Sincerely,      Mayra Leigh, RN, BSN, [facility]  Liver Transplant Coordinator  [facility] [place]  [address] [place] [address]  [phone]

## 2020-01-30 LAB
EXT ALBUMIN: 4.3
EXT ALKALINE PHOSPHATASE: 110
EXT ALT: 27
EXT AST: 28
EXT BASOPHIL%: 0
EXT BILIRUBIN TOTAL: 1.1
EXT BUN: 16
EXT CALCIUM: 9.3
EXT CHLORIDE: 106
EXT CO2: 18
EXT CREATININE: 1.48 MG/DL
EXT EOSINOPHIL%: 1
EXT GLUCOSE: 208
EXT HEMATOCRIT: 44.7
EXT HEMOGLOBIN: 15
EXT HEP B QUANT: NOT DETECTED
EXT HEP B S AB: <3.1
EXT LYMPH%: 31
EXT MONOCYTES%: 6
EXT PLATELETS: 112
EXT POTASSIUM: 3.8
EXT PROTEIN TOTAL: 7.4
EXT SEGS%: 62
EXT SODIUM: 147 MMOL/L
EXT TACROLIMUS LVL: 6.8
EXT WBC: 9.6

## 2020-02-03 ENCOUNTER — TELEPHONE (OUTPATIENT)
Dept: TRANSPLANT | Facility: CLINIC | Age: 65
End: 2020-02-03

## 2020-02-03 NOTE — LETTER
February 3, 2020    NAZIA Landon  [address] [facility]  [place] [address]          Dear NAZIA Landon:  MRN: [id number]    This is a follow up to your recent labs, your lab results were stable.  There are no medicine changes.  Please have your labs drawn again on 4/27/20.      If you cannot have your labs drawn on the scheduled date, it is your responsibility to call the transplant department to reschedule.  To reschedule or make an appointment, please as to speak to or leave a message for my assistant, Fawn Blake or Kelley, at [phone].  When leaving a message for Fawn Blake Angela or myself, we ask that you leave a brief message regarding your request.    Sincerely,    Mayra Leigh, RN, BSN, [facility]  Liver Transplant Coordinator  [facility] [place]  [address] [place] [address]  [phone]

## 2020-02-03 NOTE — TELEPHONE ENCOUNTER
Letter sent, labs stable and no medication changes are needed. Repeat labs due 4/27/20 per protocol.

## 2020-02-04 ENCOUNTER — TELEPHONE (OUTPATIENT)
Dept: TRANSPLANT | Facility: CLINIC | Age: 65
End: 2020-02-04

## 2020-02-04 NOTE — TELEPHONE ENCOUNTER
Called pt with reviewed labs;Labs are stable and repeat 4/27/20. Pt denies any other concerns or questions.

## 2020-02-04 NOTE — TELEPHONE ENCOUNTER
----- Message from Festus Amin sent at 2/4/2020  9:38 AM CST -----  Contact: pt @ [phone]  Pt is asking to speak w/ Mayra about labs that are already completed

## 2020-05-13 ENCOUNTER — TELEPHONE (OUTPATIENT)
Dept: TRANSPLANT | Facility: CLINIC | Age: 65
End: 2020-05-13

## 2020-05-13 NOTE — LETTER
May 13, 2020    NAZIA Landon  [address] [facility]  [place] [address]          Dear NAZIA Landon:  MRN: [id number]    Your lab work was due to be drawn on 4/27/20.  We contacted your lab and were unable to get test results.  It is very important to get your labs drawn as scheduled.  We cannot monitor you for rejection, infections, or drug toxicity side effects without lab results.  Please call us at [phone] as soon as possible to let us know when you plan to have labs drawn.    Sincerely,      Mayra Leigh, RN, BSN, [facility]  Liver Transplant Coordinator  [facility] [place]  [address] [place] [address]  [phone]

## 2020-05-19 ENCOUNTER — TELEPHONE (OUTPATIENT)
Dept: TRANSPLANT | Facility: CLINIC | Age: 65
End: 2020-05-19

## 2020-05-19 NOTE — TELEPHONE ENCOUNTER
Spoke with pt. States he was calling because he started running a 102 temp last night. States he went and was flu and COVID tested today. He said his temp is now down to normal after taking Tylenol. He reports other symptoms - runny nose, congestion, headaches and night sweats. Discussed with pt that if his temp spikes again to over 100.4, he really needs to be assessed in the ED. He states he understands.

## 2020-05-19 NOTE — TELEPHONE ENCOUNTER
----- Message from Meredith Weinberg sent at 5/19/2020  8:19 AM CDT -----  Contact: PT   PT called to speak to his coordinator Mayra Leigh.    Callback: [phone]

## 2020-06-01 ENCOUNTER — TELEPHONE (OUTPATIENT)
Dept: TRANSPLANT | Facility: CLINIC | Age: 65
End: 2020-06-01

## 2020-06-01 LAB
EXT ALBUMIN: 4.6
EXT ALKALINE PHOSPHATASE: 92
EXT ALT: 17
EXT AST: 21
EXT BASOPHIL%: 0
EXT BILIRUBIN TOTAL: 0.6
EXT BUN: 43
EXT CALCIUM: 9.4
EXT CHLORIDE: 108
EXT CO2: 20
EXT CREATININE: 3.37 MG/DL
EXT EOSINOPHIL%: 1
EXT GLUCOSE: 99
EXT HEMATOCRIT: 39.9
EXT HEMOGLOBIN A1C: 6.1 %
EXT HEMOGLOBIN: 13.8
EXT HEP B QUANT: NOT DETECTED
EXT HEP B S AB: NON REACTIVE
EXT HEP B S AG: NEGATIVE
EXT LYMPH%: 32
EXT MONOCYTES%: 7
EXT PLATELETS: 107
EXT POTASSIUM: 5.1
EXT PROTEIN TOTAL: 6.8
EXT SEGS%: 60
EXT SODIUM: 143 MMOL/L
EXT TACROLIMUS LVL: 6
EXT WBC: 9.6

## 2020-06-01 NOTE — TELEPHONE ENCOUNTER
----- Message from Julisa Yang sent at 6/1/2020 10:23 AM CDT -----  Contact: [facility]  Reason: Calling to get lab results from last visit    Communication: fax [phone]  Tel [phone]

## 2020-06-01 NOTE — TELEPHONE ENCOUNTER
Called pt to discuss lab results received. Pt agreed to repeat stat labs tomorrow to recheck creatinine. He states he is supposed to see nephrology but wants to see new PCP first. Advised pt that it is not safe for him to wait. Also discussed that if he is still having problems with vomiting, he needs to go to the ED. Pt agreed with plan.

## 2020-06-03 ENCOUNTER — TELEPHONE (OUTPATIENT)
Dept: TRANSPLANT | Facility: CLINIC | Age: 65
End: 2020-06-03

## 2020-06-03 NOTE — TELEPHONE ENCOUNTER
Spoke with both pt and spouse. Pt refusing to repeat labs but  Spoke with pt's wife. She will contact pt's new primary care doctor and discuss next steps with him. She will call me with updates.

## 2020-06-03 NOTE — TELEPHONE ENCOUNTER
----- Message from Jesus Brasher MD sent at 6/2/2020  1:22 PM CDT -----  Results reviewed  Can we repeat the labs? That creatinine is worrisome

## 2020-06-09 ENCOUNTER — TELEPHONE (OUTPATIENT)
Dept: TRANSPLANT | Facility: CLINIC | Age: 65
End: 2020-06-09

## 2020-06-09 NOTE — TELEPHONE ENCOUNTER
Spoke with pt's spouse. States pt saw PCP last week and had repeat labs. States they were told pt's creatinine was better and that he did not need to report to the ED. They are both unsure of what his creatinine was on these labs. Requested they have PCP send us a copy of labs for Dr. Brasher to review.  Spouse agreed with plan. She also states that pt is feeling much better. States he has not been vomiting and has been eating and drinking as he normally would.

## 2020-06-11 ENCOUNTER — TELEPHONE (OUTPATIENT)
Dept: TRANSPLANT | Facility: CLINIC | Age: 65
End: 2020-06-11

## 2020-06-11 LAB
EXT BUN: 25
EXT CREATININE: 1.9 MG/DL

## 2020-06-11 NOTE — TELEPHONE ENCOUNTER
Repeat labs done by PCP not received.    Contacted Dr. Melo's office. Spoke with his RN, Mauricio. She states he had creatinine drawn 6/3/20 and it was 1.9.      Letter sent to pt. Repeat transplant labs 7/13/20.

## 2020-06-11 NOTE — LETTER
June 11, 2020    NAZIA Landon  [address] [facility] [place] [address]          Dear NAZIA Landon:  MRN: [id number]    This is a follow up to your recent labs, your lab results were stable.  There are no medicine changes.  Please have your labs drawn again on 7/13/20.      If you cannot have your labs drawn on the scheduled date, it is your responsibility to call the transplant department to reschedule.  Please call [phone] and ask to speak to [facility] -  for all scheduling requests.     Sincerely,    Mayra Leigh, RN, BSN, [facility]  Liver Transplant Coordinator  [facility] [place]  [address] [place] [address]  [phone]

## 2020-07-15 ENCOUNTER — TELEPHONE (OUTPATIENT)
Dept: TRANSPLANT | Facility: CLINIC | Age: 65
End: 2020-07-15

## 2020-07-15 NOTE — LETTER
July 15, 2020    NAZIA Landon  [address] [facility]  [place] [address]          Dear NAZIA Landon:  MRN: [id number]    Your lab work was due to be drawn on 7/13/20.  We contacted your lab and were unable to get test results.  It is very important to get your labs drawn as scheduled.  We cannot monitor you for rejection, infections, or drug toxicity side effects without lab results.  Please call us at [phone] as soon as possible to let us know when you plan to have labs drawn.    Sincerely,    Mayra Leigh, RN, BSN, [facility]  Sr Liver Transplant Coordinator  [facility] [place]  [address] [place] [address]  [phone]

## 2020-08-11 LAB
EXT ALBUMIN: 3.8
EXT ALKALINE PHOSPHATASE: 70
EXT ALT: 9
EXT AST: 11
EXT BASOPHIL%: 0.4
EXT BILIRUBIN TOTAL: 0.6
EXT BUN: 21
EXT CALCIUM: 8.4
EXT CHLORIDE: 110
EXT CO2: 24
EXT CREATININE: 1.5 MG/DL
EXT EOSINOPHIL%: 1.1
EXT GLUCOSE: 128
EXT HEMATOCRIT: 40.8
EXT HEMOGLOBIN: 13.7
EXT HEP B S AG: NON REACTIVE
EXT LYMPH%: 34.2
EXT MONOCYTES%: 6.9
EXT PLATELETS: 94
EXT POTASSIUM: 3.9
EXT PROTEIN TOTAL: 6.6
EXT SEGS%: 57.4
EXT SODIUM: 141 MMOL/L
EXT TACROLIMUS LVL: 6
EXT WBC: 7.2

## 2020-08-14 LAB — EXT HEP B QUANT: NOT DETECTED

## 2020-08-18 ENCOUNTER — TELEPHONE (OUTPATIENT)
Dept: TRANSPLANT | Facility: CLINIC | Age: 65
End: 2020-08-18

## 2020-08-18 NOTE — TELEPHONE ENCOUNTER
Letter sent, labs stable and no medication changes are needed. Repeat labs due 11/9/20 per protocol.    ----- Message from Jesus Brasher MD sent at 8/13/2020  1:56 PM CDT -----  Results reviewed

## 2020-12-17 NOTE — LETTER
December 17, 2020    NAZIA Landon  [address] [facility]  [place] [address]          Dear NAZIA Landon:  MRN: [id number]    Your lab work was due to be drawn on 11/2020.  We contacted your lab and were unable to get test results.  It is very important to get your labs drawn as scheduled.  We cannot monitor you for rejection, infections, or drug toxicity side effects without lab results.  Please call us at [phone] as soon as possible to let us know when you plan to have labs drawn.    Sincerely,        Your Liver Transplant Coordinator    [facility] [place]  [address] [place] [address]  [phone]

## 2021-01-01 ENCOUNTER — TELEPHONE (OUTPATIENT)
Dept: TRANSPLANT | Facility: CLINIC | Age: 66
End: 2021-01-01

## 2021-01-01 ENCOUNTER — PATIENT MESSAGE (OUTPATIENT)
Dept: TRANSPLANT | Facility: CLINIC | Age: 66
End: 2021-01-01

## 2021-01-01 LAB
EXT ALBUMIN: 3.94
EXT ALBUMIN: 4.03
EXT ALBUMIN: 4.04
EXT ALBUMIN: 4.04
EXT ALKALINE PHOSPHATASE: 100
EXT ALKALINE PHOSPHATASE: 76
EXT ALKALINE PHOSPHATASE: 76
EXT ALKALINE PHOSPHATASE: 87
EXT ALT: 11
EXT ALT: 12
EXT ALT: 7
EXT ALT: 7
EXT AST: 11
EXT AST: 11
EXT AST: 14
EXT AST: 15
EXT BASOPHIL%: 0.4
EXT BASOPHIL%: 0.5
EXT BASOPHIL%: 0.6
EXT BASOPHIL%: 1.1
EXT BILIRUBIN TOTAL: 0.47
EXT BILIRUBIN TOTAL: 0.53
EXT BILIRUBIN TOTAL: 0.61
EXT BILIRUBIN TOTAL: 1.04
EXT BUN: 19
EXT BUN: 21
EXT BUN: 39
EXT BUN: 39
EXT CALCIUM: 8.5
EXT CALCIUM: 8.7
EXT CALCIUM: 8.8
EXT CALCIUM: 9.3
EXT CHLORIDE: 106
EXT CHLORIDE: 108
EXT CHLORIDE: 108
EXT CHLORIDE: 109
EXT CO2: 20.4
EXT CO2: 21.1
EXT CO2: 24.5
EXT CO2: 32.8
EXT CREATININE: 1.58 MG/DL
EXT CREATININE: 1.59 MG/DL
EXT CREATININE: 2.31 MG/DL
EXT CREATININE: 2.79 MG/DL
EXT EOSINOPHIL%: 0.5
EXT EOSINOPHIL%: 0.7
EXT EOSINOPHIL%: 0.8
EXT EOSINOPHIL%: 0.9
EXT GFR MDRD NON AF AMER: 28
EXT GLUCOSE: 125
EXT GLUCOSE: 139
EXT GLUCOSE: 166
EXT GLUCOSE: 191
EXT HEMATOCRIT: 43.4
EXT HEMATOCRIT: 44.6
EXT HEMATOCRIT: 47.6
EXT HEMATOCRIT: 48.1
EXT HEMOGLOBIN: 14.6
EXT HEMOGLOBIN: 14.9
EXT HEMOGLOBIN: 16
EXT HEMOGLOBIN: 16.3
EXT HEP B S AG: NON REACTIVE
EXT HEP B S AG: NORMAL
EXT LYMPH%: 23.3
EXT LYMPH%: 23.3
EXT LYMPH%: 24.4
EXT LYMPH%: 25.7
EXT MAGNESIUM: 1.7
EXT MONOCYTES%: 5.9
EXT MONOCYTES%: 7.4
EXT MONOCYTES%: 8.3
EXT MONOCYTES%: 8.5
EXT PHOSPHORUS: 2.7
EXT PLATELETS: 105
EXT PLATELETS: 115
EXT PLATELETS: 123
EXT PLATELETS: 97
EXT POTASSIUM: 4
EXT POTASSIUM: 4
EXT POTASSIUM: 4.3
EXT POTASSIUM: 4.5
EXT PROTEIN TOTAL: 6.6
EXT PROTEIN TOTAL: 6.7
EXT PROTEIN TOTAL: 7.2
EXT PROTEIN TOTAL: 7.4
EXT SEGS%: 64.9
EXT SEGS%: 65.8
EXT SEGS%: 67.8
EXT SEGS%: 69.2
EXT SODIUM: 139 MMOL/L
EXT SODIUM: 140 MMOL/L
EXT SODIUM: 141 MMOL/L
EXT SODIUM: 145 MMOL/L
EXT TACROLIMUS LVL: 15
EXT TACROLIMUS LVL: 4
EXT TACROLIMUS LVL: 7
EXT TACROLIMUS LVL: 8
EXT WBC: 8.4
EXT WBC: 8.7
EXT WBC: 8.9
EXT WBC: 9.8

## 2021-09-13 ENCOUNTER — TELEPHONE (OUTPATIENT)
Dept: TRANSPLANT | Facility: CLINIC | Age: 66
End: 2021-09-13

## 2021-09-16 ENCOUNTER — PATIENT MESSAGE (OUTPATIENT)
Dept: TRANSPLANT | Facility: CLINIC | Age: 66
End: 2021-09-16

## 2021-09-17 ENCOUNTER — TELEPHONE (OUTPATIENT)
Dept: TRANSPLANT | Facility: CLINIC | Age: 66
End: 2021-09-17

## 2021-09-21 ENCOUNTER — TELEPHONE (OUTPATIENT)
Dept: TRANSPLANT | Facility: CLINIC | Age: 66
End: 2021-09-21

## 2021-10-19 ENCOUNTER — POST MORTEM DOCUMENTATION (OUTPATIENT)
Dept: TRANSPLANT | Facility: CLINIC | Age: 66
End: 2021-10-19

## 2021-10-19 ENCOUNTER — TELEPHONE (OUTPATIENT)
Dept: TRANSPLANT | Facility: CLINIC | Age: 66
End: 2021-10-19

## 2021-10-20 ENCOUNTER — EPISODE CHANGES (OUTPATIENT)
Dept: TRANSPLANT | Facility: CLINIC | Age: 66
End: 2021-10-20

## 2021-10-21 ENCOUNTER — EPISODE CHANGES (OUTPATIENT)
Dept: TRANSPLANT | Facility: CLINIC | Age: 66
End: 2021-10-21

## 2023-08-14 NOTE — TELEPHONE ENCOUNTER
Already sent 8/20 Called and left pt voicemail to let him know that Dr. Brasher reviewed his labs, and they were stable. Repeat labs on 5/15/17. Also reminded pt of appt with Dr. Brasher on 5/22/17. Letter sent.   <-- Click to add NO significant Past Surgical History
